# Patient Record
Sex: FEMALE | Race: WHITE | NOT HISPANIC OR LATINO | URBAN - METROPOLITAN AREA
[De-identification: names, ages, dates, MRNs, and addresses within clinical notes are randomized per-mention and may not be internally consistent; named-entity substitution may affect disease eponyms.]

---

## 2017-09-22 ENCOUNTER — COMMUNICATION - HEALTHEAST (OUTPATIENT)
Dept: FAMILY MEDICINE | Facility: CLINIC | Age: 27
End: 2017-09-22

## 2017-09-25 ENCOUNTER — OFFICE VISIT - HEALTHEAST (OUTPATIENT)
Dept: FAMILY MEDICINE | Facility: CLINIC | Age: 27
End: 2017-09-25

## 2017-09-25 DIAGNOSIS — Z01.419 WELL FEMALE EXAM WITH ROUTINE GYNECOLOGICAL EXAM: ICD-10-CM

## 2017-09-25 RX ORDER — NORGESTIMATE AND ETHINYL ESTRADIOL 7DAYSX3 28
1 KIT ORAL DAILY
Qty: 84 TABLET | Refills: 3 | Status: SHIPPED | OUTPATIENT
Start: 2017-09-25

## 2017-09-25 ASSESSMENT — MIFFLIN-ST. JEOR: SCORE: 1535.96

## 2017-10-03 LAB
BKR LAB AP ABNORMAL BLEEDING: NO
BKR LAB AP BIRTH CONTROL/HORMONES: NORMAL
BKR LAB AP CERVICAL APPEARANCE: NORMAL
BKR LAB AP GYN ADEQUACY: NORMAL
BKR LAB AP GYN INTERPRETATION: NORMAL
BKR LAB AP GYN OTHER FINDINGS: NORMAL
BKR LAB AP HPV REFLEX: NORMAL
BKR LAB AP LMP: NORMAL
BKR LAB AP PATIENT STATUS: NORMAL
BKR LAB AP PREVIOUS ABNORMAL: NO
BKR LAB AP PREVIOUS NORMAL: 2014
HIGH RISK?: NO
PATH REPORT.COMMENTS IMP SPEC: NORMAL
RESULT FLAG (HE HISTORICAL CONVERSION): NORMAL

## 2017-10-04 ENCOUNTER — COMMUNICATION - HEALTHEAST (OUTPATIENT)
Dept: FAMILY MEDICINE | Facility: CLINIC | Age: 27
End: 2017-10-04

## 2017-11-20 ENCOUNTER — OFFICE VISIT - HEALTHEAST (OUTPATIENT)
Dept: FAMILY MEDICINE | Facility: CLINIC | Age: 27
End: 2017-11-20

## 2017-11-20 DIAGNOSIS — J40 BRONCHITIS: ICD-10-CM

## 2017-11-20 DIAGNOSIS — Z23 NEED FOR VACCINATION: ICD-10-CM

## 2017-11-20 ASSESSMENT — MIFFLIN-ST. JEOR: SCORE: 1538.22

## 2021-05-31 VITALS — HEIGHT: 72 IN | BODY MASS INDEX: 20.99 KG/M2 | WEIGHT: 155 LBS

## 2021-05-31 VITALS — HEIGHT: 72 IN | BODY MASS INDEX: 21.06 KG/M2 | WEIGHT: 155.5 LBS

## 2021-06-13 NOTE — PROGRESS NOTES
Assessment/ Plan:      1. Well female exam with routine gynecological exam   Healthy female exam completed today.  No labs were needed except for a Pap smear.  This was updated today.  I also refilled her oral birth control for her today.  I discussed healthy lifestyle modification such as a balanced diet and regular cardiovascular exercise.  She is doing well with both of these.  Plan following up yearly for annual physical and refills for her medications or sooner as needed.  - Gynecologic Cytology (PAP Smear)  - norgestimate-ethinyl estradiol (TRI-ESTARYLLA) 0.18/0.215/0.25 mg-35 mcg (28) Tab tablet; Take 1 tablet by mouth daily.  Dispense: 84 tablet; Refill: 3      Subjective:      Tatyana Eden is a 27 y.o. female who presents for an annual exam. The patient is sexually active. The patient participates in regular exercise: yes. The patient reports that there is not domestic violence in her life. No other concerns today. She would like a refill of her birth control. She is also due for a PAP.     Healthy Habits:   Regular Exercise: Yes  Sunscreen Use: Yes  Healthy Diet: Yes  Dental Visits Regularly: Yes  Seat Belt: Yes  Sexually active: Yes  Self Breast Exam Monthly:Yes  Hemoccults: N/A  Flex Sig: N/A  Colonoscopy: N/A  Lipid Profile: Yes  Glucose Screen: Yes  Prevention of Osteoporosis: No  Last Dexa: N/A  Guns at Home:  No      Immunization History   Administered Date(s) Administered     DTP 1988, 1988, 1988, 09/05/1989     Dtap 03/19/1993     HPV Quadrivalent 01/03/2014, 05/07/2014, 07/30/2016     Hep B, Peds or Adolescent 05/31/2000, 08/10/2000, 04/12/2001     Hib (PRP-T) 09/05/1989     IPV 1988, 1988, 09/05/1989     Influenza, Seasonal, Inj PF 11/17/2011     Influenza,live, Nasal Laiv4 01/03/2014     MMR 05/18/1989, 08/12/1998     Meningococcal MCV4P 07/18/2006     OPV 03/19/1993     PPD Test 1988     Td, Adult, Absorbed 05/31/2000, 05/11/2007     Immunization status: up  to date and documented.    No exam data present    Gynecologic History  Patient's last menstrual period was 09/11/2017 (approximate).  Contraception: OCP (estrogen/progesterone)  Last Pap: 2014. Results were: normal  Last mammogram: n/a. Results were: n/a      OB History   No data available       Current Outpatient Prescriptions   Medication Sig Dispense Refill     norgestimate-ethinyl estradiol (TRI-ESTARYLLA) 0.18/0.215/0.25 mg-35 mcg (28) Tab tablet Take 1 tablet by mouth daily. 84 tablet 3     No current facility-administered medications for this visit.      History reviewed. No pertinent past medical history.  Past Surgical History:   Procedure Laterality Date     WISDOM TOOTH EXTRACTION       Review of patient's allergies indicates no known allergies.  Family History   Problem Relation Age of Onset     Adopted: Yes     Family history unknown: Yes     Social History     Social History     Marital status: Single     Spouse name: N/A     Number of children: N/A     Years of education: N/A     Occupational History     Not on file.     Social History Main Topics     Smoking status: Never Smoker     Smokeless tobacco: Never Used     Alcohol use 0.0 - 0.6 oz/week     0 - 1 Glasses of wine per week     Drug use: No     Sexual activity: Yes     Partners: Male     Birth control/ protection: OCP     Other Topics Concern     Not on file     Social History Narrative     No narrative on file       Review of Systems  General:  Denies problem  Eyes: Denies problem  Ears/Nose/Throat: Denies problem  Cardiovascular: Denies problem  Respiratory:  Denies problem  Gastrointestinal:  Denies problem, Genitourinary: Denies problem  Musculoskeletal:  Denies problem  Skin: Denies problem  Neurologic: Denies problem  Psychiatric: Denies problem  Endocrine: Denies problem  Heme/Lymphatic: Denies problem   Allergic/Immunologic: Denies problem        Objective:         Vitals:    09/25/17 1633   BP: 118/78   Pulse: 88   Weight: 155 lb  "(70.3 kg)   Height: 6' 1\" (1.854 m)     Body mass index is 20.45 kg/(m^2).    Physical Exam:  General Appearance: Alert, cooperative, no distress, appears stated age  Head: Normocephalic, without obvious abnormality, atraumatic  Eyes: PERRL, conjunctiva/corneas clear, EOM's intact  Ears: Normal TM's and external ear canals, both ears  Nose: Nares normal, septum midline,mucosa normal, no drainage  Throat: Lips, mucosa, and tongue normal; teeth and gums normal  Neck: Supple, symmetrical, trachea midline, no adenopathy;  thyroid: not enlarged, symmetric, no tenderness/mass/nodules  Back: Symmetric, no curvature, ROM normal, no CVA tenderness  Lungs: Clear to auscultation bilaterally, respirations unlabored  Breasts: No breast masses, tenderness, asymmetry, or nipple discharge.  Heart: Regular rate and rhythm, S1 and S2 normal, no murmur, rub, or gallop  Abdomen: Soft, non-tender, bowel sounds active all four quadrants,  no masses, no organomegaly  Pelvic:Normally developed genitalia with no external lesions or eruptions. Vagina and cervix show no lesions, inflammation, discharge or tenderness. No cystocele, No rectocele. Uterus non-tender, mobile.  No adnexal mass or tenderness.  Extremities: Extremities normal, atraumatic, no cyanosis or edema  Skin: Skin color, texture, turgor normal, no rashes or lesions  Lymph nodes: Cervical, supraclavicular, and axillary nodes normal  Neurologic: Normal      "

## 2021-06-14 NOTE — PROGRESS NOTES
"  Assessment/Plan:         1. Bronchitis  azithromycin (ZITHROMAX) 500 MG tablet   2. Need for vaccination  CANCELED: Influenza, Seasonal,Quad Inj, 36+ MOS     Acute viral upper respiratory tract infection was likely what caused her ongoing symptoms and her cough.  I did discuss with patient the etiology of her cough is likely viral in nature however given the persistence of her cough and the symptoms for being nearly 3 weeks I did choose to treat her with a 3 day course of 500 mg azithromycin patient will let me know if she is having worsening symptoms or changes in her symptoms.  I have discussed symptomatic management such as over-the-counter medications that may improve her symptoms as well as plenty of rest, water, vitamin C, and warm water with honey.  Plan following up as discussed.  She is in agreement this plan.    Subjective:      Tatyana Eden is a 27 y.o. female who presents for concerns of a cough that has been on going since 11/1. Some days are better than others. She reports that initially she had nasal congestion and post nasal drip. She reports that over the last week she started feeling worse again. She reports that this AM she vomited from coughing. She has taken dayquil and nyquil for the symptoms. She denies any SOB, nausea, vomiting, or diarrhea.  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  Cough has been significantly worse at night with a productive cough in the morning.  She denies any ongoing nasal drainage or congestion.  She also denies any ear pain.  She has not had any fevers.    The following portions of the patient's history were reviewed and updated as appropriate: allergies, current medications and problem list.    Review of Systems   Pertinent items are noted in HPI.      Objective:      /84 (Patient Site: Right Arm, Patient Position: Sitting, Cuff Size: Adult Regular)  Pulse 100  Temp 98  F (36.7  C)  Resp 16  Ht 6' 1\" " (1.854 m)  Wt 155 lb 8 oz (70.5 kg)  BMI 20.52 kg/m2    General Appearance: Alert, cooperative, appears slightly fatigued.  Head: Normocephalic, without obvious abnormality, atraumatic.  Eyes: PERRL, conjunctiva/corneas clear, EOM's intact.  Ears: Normal TM's and external ear canals, both ears.  Nose: Nares congested mildly.  No overt nasal images present.  Throat: Slightly erythematous. No exudates.  No tonsillar hypertrophy.  Neck: Supple, symmetrical, trachea midline, no adenopathy.  Heart : normal rate, regular rhythm, normal S1, S2, no murmurs, rubs, clicks or gallops.  Lungs: Clear to auscultation bilaterally, respirations unlabored.  Extremities: Extremities normal, atraumatic, no cyanosis or edema.  Lymph nodes: Cervical, supraclavicular, and axillary nodes normal.